# Patient Record
Sex: FEMALE | Race: BLACK OR AFRICAN AMERICAN | NOT HISPANIC OR LATINO | Employment: UNEMPLOYED | URBAN - METROPOLITAN AREA
[De-identification: names, ages, dates, MRNs, and addresses within clinical notes are randomized per-mention and may not be internally consistent; named-entity substitution may affect disease eponyms.]

---

## 2022-01-02 ENCOUNTER — OFFICE VISIT (OUTPATIENT)
Dept: URGENT CARE | Facility: CLINIC | Age: 42
End: 2022-01-02
Payer: MEDICAID

## 2022-01-02 VITALS
RESPIRATION RATE: 16 BRPM | HEART RATE: 77 BPM | TEMPERATURE: 97 F | WEIGHT: 160 LBS | SYSTOLIC BLOOD PRESSURE: 144 MMHG | BODY MASS INDEX: 25.11 KG/M2 | HEIGHT: 67 IN | OXYGEN SATURATION: 99 % | DIASTOLIC BLOOD PRESSURE: 90 MMHG

## 2022-01-02 DIAGNOSIS — L30.9 DERMATITIS: Primary | ICD-10-CM

## 2022-01-02 PROCEDURE — 3008F PR BODY MASS INDEX (BMI) DOCUMENTED: ICD-10-PCS | Mod: CPTII,S$GLB,, | Performed by: NURSE PRACTITIONER

## 2022-01-02 PROCEDURE — 3080F PR MOST RECENT DIASTOLIC BLOOD PRESSURE >= 90 MM HG: ICD-10-PCS | Mod: CPTII,S$GLB,, | Performed by: NURSE PRACTITIONER

## 2022-01-02 PROCEDURE — 1159F MED LIST DOCD IN RCRD: CPT | Mod: CPTII,S$GLB,, | Performed by: NURSE PRACTITIONER

## 2022-01-02 PROCEDURE — 1160F RVW MEDS BY RX/DR IN RCRD: CPT | Mod: CPTII,S$GLB,, | Performed by: NURSE PRACTITIONER

## 2022-01-02 PROCEDURE — 3077F SYST BP >= 140 MM HG: CPT | Mod: CPTII,S$GLB,, | Performed by: NURSE PRACTITIONER

## 2022-01-02 PROCEDURE — 3077F PR MOST RECENT SYSTOLIC BLOOD PRESSURE >= 140 MM HG: ICD-10-PCS | Mod: CPTII,S$GLB,, | Performed by: NURSE PRACTITIONER

## 2022-01-02 PROCEDURE — 3008F BODY MASS INDEX DOCD: CPT | Mod: CPTII,S$GLB,, | Performed by: NURSE PRACTITIONER

## 2022-01-02 PROCEDURE — 3080F DIAST BP >= 90 MM HG: CPT | Mod: CPTII,S$GLB,, | Performed by: NURSE PRACTITIONER

## 2022-01-02 PROCEDURE — 99203 OFFICE O/P NEW LOW 30 MIN: CPT | Mod: S$GLB,,, | Performed by: NURSE PRACTITIONER

## 2022-01-02 PROCEDURE — 1159F PR MEDICATION LIST DOCUMENTED IN MEDICAL RECORD: ICD-10-PCS | Mod: CPTII,S$GLB,, | Performed by: NURSE PRACTITIONER

## 2022-01-02 PROCEDURE — 1160F PR REVIEW ALL MEDS BY PRESCRIBER/CLIN PHARMACIST DOCUMENTED: ICD-10-PCS | Mod: CPTII,S$GLB,, | Performed by: NURSE PRACTITIONER

## 2022-01-02 PROCEDURE — 99203 PR OFFICE/OUTPT VISIT, NEW, LEVL III, 30-44 MIN: ICD-10-PCS | Mod: S$GLB,,, | Performed by: NURSE PRACTITIONER

## 2022-01-02 RX ORDER — TRIAMCINOLONE ACETONIDE 1 MG/G
CREAM TOPICAL 2 TIMES DAILY
Qty: 15 G | Refills: 0 | Status: SHIPPED | OUTPATIENT
Start: 2022-01-02 | End: 2022-01-12

## 2022-01-02 RX ORDER — HYDROXYZINE HYDROCHLORIDE 25 MG/1
25 TABLET, FILM COATED ORAL NIGHTLY PRN
Qty: 20 TABLET | Refills: 0 | Status: SHIPPED | OUTPATIENT
Start: 2022-01-02

## 2022-01-02 NOTE — PATIENT INSTRUCTIONS
If your condition worsens or fails to improve we recommend that you receive another evaluation at the ER immediately or contact your PCP to discuss your concerns or return here. You must understand that you've received an urgent care treatment only and that you may be released before all your medical problems are known or treated. You the patient will arrange for followup care as instructed.   Increase fluids and rest are important  Please take over the counter Pepcid or Zantac as directed for the next 24-72hours as needed.  Please take Claritin or Zyrtec or Allegra (24 hours) twice a day.  You can add Benadryl or Hydroxyzine as needed for itching, however these may make you drowsy, so do not  drive or operate heavy equipment or machinery while taking these medications.    If you develop additional symptoms such as tongue swelling or trouble breathing go immediately to the ER.     If you were stung by an insect rarely do these get infected. However if it gets painful, increase redness or drainage you need to be rechecked either here or call your PCP.    In the future make sure to keep benadryl with you or an Epi-pen if you were prescribed one.    If your condition worsens or fails to improve we recommend that you receive another evaluation at the ER immediately or contact your PCP to discuss your concerns or return here. You must understand that you've received an urgent care treatment only and that you may be released before all your medical problems are known or treated. You the patient will arrange for followup care as instructed.    If you were prescribed antibiotics, please take them to completion.  If you were prescribed a narcotic medication, do not drive or operate heavy equipment or machinery while taking these medications.  Please follow up with your primary care doctor or specialist as needed.  If you  smoke, please stop smoking.            Patient  Education       Dermatitis   The Basics   Written by the doctors and editors at [facility]   What is dermatitis? -- Dermatitis is a type of skin rash that can happen after your skin touches something that irritates it or something you are allergic to.  Things that irritate the skin can be found in products you use every day, such as soaps or cleansers. Some of the things that can cause skin allergies include:  · Certain medicines, perfumes, or cosmetics  · The metal in some kinds of jewelry  · Plants, such as poison ivy and poison oak  Sometimes you can develop a rash the first time you touch something. But it is also possible to get a rash from something you have used before without any problems.  What other symptoms should I watch for? -- If you have a rash, your skin might be dry, itchy, or cracked. In people with light skin, the rash is often red. In people with darker skin, it might appear purple, brown, gray, or black. If your rash is caused by an allergy, you might also have some swelling or blisters where you have the rash.   Severe symptoms include:  · Pain  · Widespread swelling  · Blisters, oozing, or crusting of the skin  What can I do to get rid of my dermatitis? -- You can:  · Avoid using or touching whatever might have caused your rash  · Protect your skin from anything that might irritate it or cause an allergy. For example, wear gloves if you need to work with harsh soaps.  · Try using soothing skin products to help with the itching and discomfort. Things that might help include:  ? Unscented, thick moisturizing cream or petroleum jelly  ? A special kind of bath called an oatmeal bath  Should I see a doctor or nurse? -- See your doctor or nurse if your rash does not go away within 2 weeks, or if it gets worse. Your doctor can help figure out what could be causing your rash.  How are skin rashes treated? -- Your doctor might prescribe different treatments or medicines to help your rash. These can  include:  · Steroid creams and ointments - These are not the same as the steroids some athletes take illegally. They go on the skin, and they relieve itching and redness.  · Steroid pills - You might need to take these for a short time if your rash is severe. But your doctor or nurse will want to take you off steroid pills as soon as possible. Even though these medicines help, they can also cause problems of their own.  · Wet or damp dressings - These can be helpful for skin that is crusting or oozing. To use a wet or damp dressing, you will need to wear 2 layers of clothing. First, you put on a layer of damp cotton clothes over your rash. Then, you put on a layer of dry clothes on top of the damp ones. People who need these dressings often wear them at night when they sleep.  All topics are updated as new evidence becomes available and our peer review process is complete.  This topic retrieved from [employer] on: Sep 21, 2021.  Topic 82984 Version 8.0  Release: 29.4.2 - C29.263  © 2021 UpToDate, Inc. and/or its affiliates. All rights reserved.  picture 1: Irritant contact dermatitis     Irritant contact dermatitis usually affects the hands. It causes the skin to turn red and dry, and to chap and crack.  Graphic 98241 Version 4.0    Consumer Information Use and Disclaimer   This information is not specific medical advice and does not replace information you receive from your health care provider. This is only a brief summary of general information. It does NOT include all information about conditions, illnesses, injuries, tests, procedures, treatments, therapies, discharge instructions or life-style choices that may apply to you. You must talk with your health care provider for complete information about your health and treatment options. This information should not be used to decide whether or not to accept your health care provider's advice, instructions or recommendations. Only your health care provider has the  knowledge and training to provide advice that is right for you. The use of this information is governed by the [employer] End User License Agreement, available at https://www.COMPS.com."[employer]"/en/solutions/[employer]/about/daniel.The use of [employer] content is governed by the [employer] Terms of Use. ©2021 UpToDate, Inc. All rights reserved.  Copyright   © 2021 UpToDate, Inc. and/or its affiliates. All rights reserved.

## 2022-01-02 NOTE — PROGRESS NOTES
"Subjective:       Patient ID: Everton Miguel is a 41 y.o. female.    Vitals:  height is 5' 7" (1.702 m) and weight is 72.6 kg (160 lb). Her temperature is 97.1 °F (36.2 °C). Her blood pressure is 144/90 (abnormal) and her pulse is 77. Her respiration is 16 and oxygen saturation is 99%.     Chief Complaint: Rash    This is a 41 y.o. female who presents today with a chief complaint of generalized itching and and rash on her left ankle started 2-3 days ago, patient is unsure about any exposure to new products, denies using new products or detergents,patient denies facial swelling or rash on her upper extremities or face, denies fever, body aches or chills, denies cough, wheezing or shortness of breath, denies nausea, vomiting, diarrhea or abdominal pain, denies chest pain or dizziness positional lightheadedness, denies sore throat or trouble swallowing, denies loss of taste or smell, or any other symptoms        Rash  This is a new problem. The current episode started in the past 7 days. The problem is unchanged. The rash is diffuse. The rash is characterized by itchiness. It is unknown if there was an exposure to a precipitant. Pertinent negatives include no cough, diarrhea, fatigue, fever, rhinorrhea, shortness of breath, sore throat or vomiting. Past treatments include nothing. There is no history of allergies, asthma, eczema or varicella.       Constitution: Negative for chills, sweating, fatigue and fever.   HENT: Negative for sore throat.    Cardiovascular: Negative for chest pain.   Respiratory: Negative for chest tightness, cough, shortness of breath and wheezing.    Gastrointestinal: Negative for nausea, vomiting, constipation and diarrhea.   Skin: Positive for rash. Negative for erythema.   Allergic/Immunologic: Negative for environmental allergies and seasonal allergies.   Neurological: Negative for dizziness and light-headedness.       Objective:      Physical Exam   Constitutional: She is oriented to " person, place, and time. She appears well-developed and well-nourished.   HENT:   Head: Normocephalic and atraumatic. Head is without abrasion, without contusion and without laceration.   Ears:   Right Ear: External ear normal.   Left Ear: External ear normal.   Nose: Nose normal.   Mouth/Throat: Oropharynx is clear and moist and mucous membranes are normal.   Eyes: Conjunctivae, EOM and lids are normal. Pupils are equal, round, and reactive to light.   Neck: Trachea normal and phonation normal. Neck supple.   Cardiovascular: Normal rate, regular rhythm and normal heart sounds.   Pulmonary/Chest: Effort normal and breath sounds normal. No stridor. No respiratory distress.   Musculoskeletal: Normal range of motion.         General: Normal range of motion.   Neurological: She is alert and oriented to person, place, and time.   Skin: Skin is warm, dry, intact and rash (localized to left ankle, see pic, dry skin noted, no erythema, swelling noted, no active draiange or discharge noted,,no rash noted on face, upper extremities, abdomen, back or neck). Capillary refill takes less than 2 seconds. No abrasion, No burn, No bruising, No erythema and No ecchymosis   Psychiatric: She has a normal mood and affect. Her speech is normal and behavior is normal. Judgment and thought content normal. Cognition and memory  Nursing note and vitals reviewed.              Patient in no acute distress.  Vitals reassuring.  Medication prescribed and over-the-counter medications discussed with patient in length.  I reiterated the importance of further evaluation and follow-up with dermatologist if no improvement in symptoms.  Discussed results/diagnosis/plan in depth with patient in clinic. Strict precautions given to patient to monitor for worsening signs and symptoms. Advised to follow up with primary.All questions answered. Strict ER precautions given. If your symptoms worsens or fail to improve you should go to the Emergency Room.  Discharge and follow-up instructions given verbally/printed. Discharge and follow-up instructions discussed with the patient who expressed understanding and willingness to comply with my recommendations.Patient voiced understanding and in agreement with current treatment plan.     Please be advised this text was dictated with [employer] software and may contain errors due to translation.      Assessment:       1. Dermatitis          Plan:         Dermatitis    Other orders  -     triamcinolone acetonide 0.1% (KENALOG) 0.1 % cream; Apply topically 2 (two) times daily. for 10 days  Dispense: 15 g; Refill: 0  -     hydrOXYzine HCL (ATARAX) 25 MG tablet; Take 1 tablet (25 mg total) by mouth nightly as needed for Itching.  Dispense: 20 tablet; Refill: 0         Patient Instructions                                                       If your condition worsens or fails to improve we recommend that you receive another evaluation at the ER immediately or contact your PCP to discuss your concerns or return here. You must understand that you've received an urgent care treatment only and that you may be released before all your medical problems are known or treated. You the patient will arrange for followup care as instructed.   Increase fluids and rest are important  Please take over the counter Pepcid or Zantac as directed for the next 24-72hours as needed.  Please take Claritin or Zyrtec or Allegra (24 hours) twice a day.  You can add Benadryl or Hydroxyzine as needed for itching, however these may make you drowsy, so do not  drive or operate heavy equipment or machinery while taking these medications.    If you develop additional symptoms such as tongue swelling or trouble breathing go immediately to the ER.     If you were stung by an insect rarely do these get infected. However if it gets painful, increase redness or drainage you need to be rechecked either here or call your PCP.    In the future make sure to keep benadryl  with you or an Epi-pen if you were prescribed one.    If your condition worsens or fails to improve we recommend that you receive another evaluation at the ER immediately or contact your PCP to discuss your concerns or return here. You must understand that you've received an urgent care treatment only and that you may be released before all your medical problems are known or treated. You the patient will arrange for followup care as instructed.    If you were prescribed antibiotics, please take them to completion.  If you were prescribed a narcotic medication, do not drive or operate heavy equipment or machinery while taking these medications.  Please follow up with your primary care doctor or specialist as needed.  If you  smoke, please stop smoking.            Patient Education       Dermatitis   The Basics   Written by the doctors and editors at [facility]   What is dermatitis? -- Dermatitis is a type of skin rash that can happen after your skin touches something that irritates it or something you are allergic to.  Things that irritate the skin can be found in products you use every day, such as soaps or cleansers. Some of the things that can cause skin allergies include:  · Certain medicines, perfumes, or cosmetics  · The metal in some kinds of jewelry  · Plants, such as poison ivy and poison oak  Sometimes you can develop a rash the first time you touch something. But it is also possible to get a rash from something you have used before without any problems.  What other symptoms should I watch for? -- If you have a rash, your skin might be dry, itchy, or cracked. In people with light skin, the rash is often red. In people with darker skin, it might appear purple, brown, gray, or black. If your rash is caused by an allergy, you might also have some swelling or blisters where you have the rash.   Severe symptoms include:  · Pain  · Widespread swelling  · Blisters, oozing, or crusting of the skin  What can I do to  get rid of my dermatitis? -- You can:  · Avoid using or touching whatever might have caused your rash  · Protect your skin from anything that might irritate it or cause an allergy. For example, wear gloves if you need to work with harsh soaps.  · Try using soothing skin products to help with the itching and discomfort. Things that might help include:  ? Unscented, thick moisturizing cream or petroleum jelly  ? A special kind of bath called an oatmeal bath  Should I see a doctor or nurse? -- See your doctor or nurse if your rash does not go away within 2 weeks, or if it gets worse. Your doctor can help figure out what could be causing your rash.  How are skin rashes treated? -- Your doctor might prescribe different treatments or medicines to help your rash. These can include:  · Steroid creams and ointments - These are not the same as the steroids some athletes take illegally. They go on the skin, and they relieve itching and redness.  · Steroid pills - You might need to take these for a short time if your rash is severe. But your doctor or nurse will want to take you off steroid pills as soon as possible. Even though these medicines help, they can also cause problems of their own.  · Wet or damp dressings - These can be helpful for skin that is crusting or oozing. To use a wet or damp dressing, you will need to wear 2 layers of clothing. First, you put on a layer of damp cotton clothes over your rash. Then, you put on a layer of dry clothes on top of the damp ones. People who need these dressings often wear them at night when they sleep.  All topics are updated as new evidence becomes available and our peer review process is complete.  This topic retrieved from [employer] on: Sep 21, 2021.  Topic 72491 Version 8.0  Release: 29.4.2 - C29.263  © 2021 UpToDate, Inc. and/or its affiliates. All rights reserved.  picture 1: Irritant contact dermatitis     Irritant contact dermatitis usually affects the hands. It causes the  skin to turn red and dry, and to chap and crack.  Graphic 85229 Version 4.0    Consumer Information Use and Disclaimer   This information is not specific medical advice and does not replace information you receive from your health care provider. This is only a brief summary of general information. It does NOT include all information about conditions, illnesses, injuries, tests, procedures, treatments, therapies, discharge instructions or life-style choices that may apply to you. You must talk with your health care provider for complete information about your health and treatment options. This information should not be used to decide whether or not to accept your health care provider's advice, instructions or recommendations. Only your health care provider has the knowledge and training to provide advice that is right for you. The use of this information is governed by the [employer] End User License Agreement, available at https://www.K[employer]/en/solutions/[employer]/about/daniel.The use of [employer] content is governed by the [employer] Terms of Use. ©2021 UpToDate, Inc. All rights reserved.  Copyright   © 2021 UpToDate, Inc. and/or its affiliates. All rights reserved.